# Patient Record
Sex: FEMALE | Race: WHITE
[De-identification: names, ages, dates, MRNs, and addresses within clinical notes are randomized per-mention and may not be internally consistent; named-entity substitution may affect disease eponyms.]

---

## 2018-11-05 NOTE — DIAGNOSTIC IMAGING REPORT
HEPATOBILIARY SCAN



INDICATION: Right upper abdominal pain



Report: Following the administration of 6 mCi of Tc-99m mebrofenin, dynamic

images of the abdomen in the anterior projection were obtained through 60

minutes.  Additional static image was  obtained at 3 hours.



Perfusion of the liver is normal.  Extraction of tracer from the blood pool by

the liver parenchyma is normal.  Tracer appears promptly with in the biliary

tract.  Tracer is seen in the small bowel by 26 minutes post injection of the

radiotracer.  The gallbladder does not fill during the initial 60-minute

dynamic sequence and does not fill by 3 hours.  



Impression:



1.  Absence of filling of the gallbladder through 3 hours post administration

of the radiotracer supports the clinical diagnosis of chronic and/or acute

cholecystitis.  



Signed by: Dr. Megha Mchugh M.D. on 11/5/2018 6:50 PM

## 2019-02-01 NOTE — XMS REPORT
Patient Summary Document

                             Created on: 2019



NORIS GIVENS

External Reference #: [id number]

: 1965

Sex: Female



Demographics







                          Address                   [address] [place] [address]

 

                          Home Phone                [phone]

 

                          Preferred Language        Unknown

 

                          Marital Status            Unknown

 

                          Jehovah's witness Affiliation     Unknown

 

                          Race                      Unknown

 

                                        Additional Race(s)  

 

                          Ethnic Group              Unknown





Author







                          [facility]                    [facility]

 

                          [facility]

 

                          Address                   Unknown

 

                          Phone                     Unavailable







Support







                Name            Relationship    Address         Phone

 

                    HARRY GIVENS        PRS                 [address], [place]  [address]                    [phone]

 

                    HARRY GIVENS        PRS                 [address], [place]  [address]                    [phone]







Care Team Providers







                    Care Team Member Name    Role                Phone

 

                    IVETT MCCURDY     Unavailable         Unavailable







Payers







             Payer Name    Policy Type    Policy Number    Effective Date    Expiration Date







Problems

This patient has no known problems.



Allergies, Adverse Reactions, Alerts







          Allergy Name    Allergy Type    Status    Severity    Reaction(s)    Onset Date    Inactive 

Date                      Treating Clinician        Comments

 

        No Known Allergies    DA      Active    U               2012 00:00:00                     







Medications

This patient has no known medications.



Results







           Test Description    Test Time    Test Comments    Text Results    Atomic Results    Result

 Comments

 

                HEPTOBILIARY    2018 18:46:00                                                              

                                            [place] [address]   
  Patient Name: NORIS GIVENS                                   MR #: 
[id number]                     : 1965                                  
Age/Sex: 53/F  Acct #: [id number]                              Req #: [id number]
[id number]  Adm Physician:                                                      
Ordered by: JESUS MCCURDY MD                            Report #: [id number]   
    Location: [facility]                                      Room/Bed:                 
   
___________________________________________________________________________________________________
   Procedure: [id number] [facility]/HEPTOBILIARY  Exam Date:                             
Exam Time:                                               REPORT STATUS: Signed  
    HEPATOBILIARY SCAN      INDICATION: Right upper abdominal pain      Report: 
Following the administration of 6 mCi of Tc-99m mebrofenin, dynamic   images of 
the abdomen in the anterior projection were obtained through 60   minutes.  
Additional static image was  obtained at 3 hours.      Perfusion of the liver is
normal.  Extraction of tracer from the blood pool by   the liver parenchyma is 
normal.  Tracer appears promptly with in the biliary   tract.  Tracer is seen in
the small bowel by 26 minutes post injection of the   radiotracer.  The 
gallbladder does not fill during the initial 60-minute   dynamic sequence and 
does not fill by 3 hours.        Impression:      1.  Absence of filling of the 
gallbladder through 3 hours post administration   of the radiotracer supports 
the clinical diagnosis of chronic and/or acute   cholecystitis.        Signed 
by: Dr. Beulah Owen M.D. on 2018 6:50 PM        Dictated By: BEULAH OWEN MD  Electronically Signed By: BEULAH OWEN MD on 18  Transcribed By: 
LISSTE on 18       COPY TO:   JESUS MCCURDY MD

## 2019-02-01 NOTE — OPERATIVE REPORT
DATE OF PROCEDURE:  February 01, 2019 



REFERRING PHYSICIAN:  Dr. Jesus Mccurdy 



PROCEDURES PERFORMED

1. Esophagogastroduodenoscopy with biopsies.  

2. Colonoscopy with polypectomy and biopsies.



INDICATION FOR EGD:  Dyspepsia.  



INDICATION FOR COLONOSCOPY:  History of bloody diarrhea.



MEDICATION:  Patient was done under MAC.  Please see anesthesiologist's 

note.



PROCEDURE:  With the patient in the left lateral decubitus position, the 

flexible fiberoptic Olympus gastroscope was introduced into the esophagus 

under direct visualization without any difficulty.  There was some patchy 

erythema noted in the distal esophagus.  A minute tongue of velvety red 

mucosa was noted to extend proximally from the GE junction.  That was 

biopsied to rule out Jones's.  The scope was then advanced with ease into 

the stomach.  The mucosa overlying the antrum and the body revealed some 

patchy erythema and mild to moderate edema, and biopsies were obtained and 

sent to stain for H. pylori.  The pylorus was of normal contour and shape.  

It was intubated with ease, and the scope was advanced all the way to the 

2nd portion of the duodenum.  Biopsies were obtained from the proximal 2nd 

portion and the duodenal bulb to rule out sprue.  The scope was then 

withdrawn back into the stomach and retroflexed.  Mucosa overlying the 

fundus and the cardia appeared to be within normal limits.  The scope was 

then straightened out.  It was subsequently withdrawn.  Patient tolerated 

the procedure well.



IMPRESSION  

1. Mild distal esophagitis.

2. Rule out Jones's esophagus.

3. Gastritis, biopsied.  Biopsies sent to stain for H. pylori.

4. Rule out sprue.



PLAN:  Follow up histology.  Initiate Protonix 40 mg 1 p.o. q.a.m. a.c.



The patient was then turned around.  After adequate lubrication of the anal 

canal, a flexible fiberoptic Olympus colonoscope was inserted into the 

rectum with ease and advanced all the way to the cecum.  The mucosa 

overlying the cecum appeared to be within normal limits.   The ileocecal 

valve was intubated, and the scope was advanced into the terminal ileum.  

Biopsies were obtained from the terminal ileum.  The scope was then 

withdrawn back into the colon.  It was then withdrawn slowly.  Mucosa 

overlying the ascending and transverse appeared to be within normal limits. 

 There were some patchy mild inflammatory changes noted in the left colon 

and rectum, and random biopsies were obtained.  One polyp was snared from 

the sigmoid colon.  The scope was then retroflexed into the distal rectum, 

and small internal hemorrhoids were noted none of which was actively 

bleeding.  The scope was then straightened out.  It was subsequently 

withdrawn after securing an adequate stool specimen that was sent for the 

appropriate stool studies.  Patient tolerated the procedure well.  



IMPRESSION

1. Mild patchy left-sided colitis.

2. Sigmoid colon polyp, snared.  

3. Proctitis, mild.  

4. Internal hemorrhoids, none actively bleeding.  



PLAN:  Follow up histology.  Follow up stool studies.  Initiate Bentyl 20 

mg 1 p.o. t.i.d. and VSL #3 one p.o. daily.  Patient might benefit from a 

followup colonoscopy in 3 to 5 years.  





DD:  02/01/2019 14:16

DT:  02/01/2019 15:42

Job#:  [id number] 

cc:JESUS MCCURDY MD

## 2020-07-27 ENCOUNTER — HOSPITAL ENCOUNTER (OUTPATIENT)
Dept: HOSPITAL 88 - ER | Age: 55
Setting detail: OBSERVATION
LOS: 1 days | Discharge: HOME | End: 2020-07-28
Attending: FAMILY MEDICINE | Admitting: FAMILY MEDICINE
Payer: COMMERCIAL

## 2020-07-27 VITALS — BODY MASS INDEX: 39.65 KG/M2 | WEIGHT: 238 LBS | HEIGHT: 65 IN

## 2020-07-27 VITALS — DIASTOLIC BLOOD PRESSURE: 79 MMHG | SYSTOLIC BLOOD PRESSURE: 159 MMHG

## 2020-07-27 VITALS — SYSTOLIC BLOOD PRESSURE: 143 MMHG | DIASTOLIC BLOOD PRESSURE: 67 MMHG

## 2020-07-27 DIAGNOSIS — E78.5: ICD-10-CM

## 2020-07-27 DIAGNOSIS — F32.9: ICD-10-CM

## 2020-07-27 DIAGNOSIS — Z20.828: ICD-10-CM

## 2020-07-27 DIAGNOSIS — I10: ICD-10-CM

## 2020-07-27 DIAGNOSIS — R07.89: Primary | ICD-10-CM

## 2020-07-27 DIAGNOSIS — R53.1: ICD-10-CM

## 2020-07-27 DIAGNOSIS — Z90.49: ICD-10-CM

## 2020-07-27 DIAGNOSIS — R06.02: ICD-10-CM

## 2020-07-27 LAB
ALBUMIN SERPL-MCNC: 3.6 G/DL (ref 3.5–5)
ALBUMIN/GLOB SERPL: 1.1 {RATIO} (ref 0.8–2)
ALP SERPL-CCNC: 70 IU/L (ref 40–150)
ALT SERPL-CCNC: 40 IU/L (ref 0–55)
ANION GAP SERPL CALC-SCNC: 14.9 MMOL/L (ref 8–16)
BASOPHILS # BLD AUTO: 0.1 10*3/UL (ref 0–0.1)
BASOPHILS NFR BLD AUTO: 0.4 % (ref 0–1)
BUN SERPL-MCNC: 10 MG/DL (ref 7–26)
BUN/CREAT SERPL: 13 (ref 6–25)
CALCIUM SERPL-MCNC: 9.2 MG/DL (ref 8.4–10.2)
CHLORIDE SERPL-SCNC: 103 MMOL/L (ref 98–107)
CK MB SERPL-MCNC: 0.8 NG/ML (ref 0–5)
CK SERPL-CCNC: 31 IU/L (ref 29–168)
CO2 SERPL-SCNC: 25 MMOL/L (ref 22–29)
DEPRECATED NEUTROPHILS # BLD AUTO: 6.8 10*3/UL (ref 2.1–6.9)
EGFRCR SERPLBLD CKD-EPI 2021: > 60 ML/MIN (ref 60–?)
EOSINOPHIL # BLD AUTO: 0.4 10*3/UL (ref 0–0.4)
EOSINOPHIL NFR BLD AUTO: 3.1 % (ref 0–6)
ERYTHROCYTE [DISTWIDTH] IN CORD BLOOD: 12.8 % (ref 11.7–14.4)
GLOBULIN PLAS-MCNC: 3.2 G/DL (ref 2.3–3.5)
GLUCOSE SERPLBLD-MCNC: 92 MG/DL (ref 74–118)
HCT VFR BLD AUTO: 43.6 % (ref 34.2–44.1)
HGB BLD-MCNC: 14.3 G/DL (ref 12–16)
LYMPHOCYTES # BLD: 3.7 10*3/UL (ref 1–3.2)
LYMPHOCYTES NFR BLD AUTO: 30.6 % (ref 18–39.1)
MCH RBC QN AUTO: 29.2 PG (ref 28–32)
MCHC RBC AUTO-ENTMCNC: 32.8 G/DL (ref 31–35)
MCV RBC AUTO: 89.2 FL (ref 81–99)
MONOCYTES # BLD AUTO: 0.7 10*3/UL (ref 0.2–0.8)
MONOCYTES NFR BLD AUTO: 6 % (ref 4.4–11.3)
NEUTS SEG NFR BLD AUTO: 57.1 % (ref 38.7–80)
PLATELET # BLD AUTO: 320 X10E3/UL (ref 140–360)
POTASSIUM SERPL-SCNC: 3.9 MMOL/L (ref 3.5–5.1)
RBC # BLD AUTO: 4.89 X10E6/UL (ref 3.6–5.1)
SODIUM SERPL-SCNC: 139 MMOL/L (ref 136–145)

## 2020-07-27 PROCEDURE — 85025 COMPLETE CBC W/AUTO DIFF WBC: CPT

## 2020-07-27 PROCEDURE — 36415 COLL VENOUS BLD VENIPUNCTURE: CPT

## 2020-07-27 PROCEDURE — 82553 CREATINE MB FRACTION: CPT

## 2020-07-27 PROCEDURE — 84484 ASSAY OF TROPONIN QUANT: CPT

## 2020-07-27 PROCEDURE — 80061 LIPID PANEL: CPT

## 2020-07-27 PROCEDURE — 84443 ASSAY THYROID STIM HORMONE: CPT

## 2020-07-27 PROCEDURE — 93005 ELECTROCARDIOGRAM TRACING: CPT

## 2020-07-27 PROCEDURE — 80053 COMPREHEN METABOLIC PANEL: CPT

## 2020-07-27 PROCEDURE — 80048 BASIC METABOLIC PNL TOTAL CA: CPT

## 2020-07-27 PROCEDURE — 82550 ASSAY OF CK (CPK): CPT

## 2020-07-27 PROCEDURE — 71045 X-RAY EXAM CHEST 1 VIEW: CPT

## 2020-07-27 PROCEDURE — 99284 EMERGENCY DEPT VISIT MOD MDM: CPT

## 2020-07-27 NOTE — DIAGNOSTIC IMAGING REPORT
EXAMINATION:  CHEST SINGLE (PORTABLE)    



INDICATION: Difficulty breathing.



COMPARISON:  None

     

FINDINGS:    



TUBES and LINES:  None.



LUNGS:  Low lung volumes.  There is hazy opacification of the bilateral lung

bases.  



PLEURA:  No pleural effusion or pneumothorax.



HEART AND MEDIASTINUM:  The cardiomediastinal silhouette is unremarkable.    



BONES AND SOFT TISSUES:  No acute osseous lesion.  Soft tissues are

unremarkable.



UPPER ABDOMEN: No free air under the diaphragm.    



IMPRESSION: 



Hazy opacification in bilateral lung bases which may represent atelectasis

and/or pneumonia in the proper clinical setting.





Signed by: Kimberly Vance MD on 7/27/2020 7:54 PM

## 2020-07-27 NOTE — EMERGENCY DEPARTMENT NOTE
History of Present Illnes


History of Present Illness


Chief Complaint:  Chest Pain


History of Present Illness


This is a 55 year old  female  .


Historian:  Patient


Arrival Mode:  Car


Onset (how long ago):  day(s) (1)


Location:  CHEST


Quality:  DULL


Radiation:  Denies non-radiation, Denies back, Denies neck, Denies extremity, 

Denies abdomen, Denies periumbilical, Denies flank, Denies proximal, Denies 

distal, Denies other


Severity:  mild


Onset quality:  gradual


Duration (how long):  day(s) (1)


Timing of current episode:  intermittent


Progression:  waxing and waning


Chronicity:  new


Context:  Denies recent illness, Denies recent surgery, Denies recent 

immobilization, Denies recent travel, Denies trauma/injury, Denies new 

medications, Denies hx of DVT/PE, Denies non-compliance w/ medications, Denies 

other


Relieving factors:  none


Exacerbating factors:  none


Associated symptoms:  Reports chest pain; 


   Denies denies other symptoms, Denies confusion, Denies cough, Denies 

diaphoresis, Denies fever/chills, Denies headaches, Denies loss of appetite, 

Denies malaise, Denies nausea/vomiting, Denies rash, Denies seizure, Denies 

shortness of breath, Denies syncope, Denies weakness, Denies other


Treatments prior to arrival:  none





Past Medical/Family History


Physician Review


I have reviewed the patient's past medical and family history.  Any updates have

been documented here.





Past Medical History


Recent Fever:  No


Clinical Suspicion of Infectio:  No


New/Unexplained Change in Ment:  No


Past Medical History:  Hypertension, Depression


Other Medical History:  


ULCERATIVE COLITIS


Past Surgical History:  Cholecysctectomy, T&A, Tubal Ligation





Social History


Smoking Cessation:  Unknown if ever smoked


Counseling Performed:  No


Alcohol Use:  None


Any Illegal Drug Use:  No


Physically hurt or threatened:  No





Other


Any Pre-Existing Lines (PICC,:  No





Review of Systems


Review of Systems


Constitutional:  Reports no symptoms


Cardiovascular:  Reports as per HPI





Physical Exam


Related Data


Allergies:  


Coded Allergies:  


     No Known Allergies (Unverified , 2/1/19)


Triage Vital Signs





Vital Signs








  Date Time  Temp Pulse Resp B/P (MAP) Pulse Ox O2 Delivery O2 Flow Rate FiO2


 


7/27/20 16:24 99.5 84 18 169/83 98 Room Air  








Vital signs reviewed:  Yes





Physical Exam


CONSTITUTIONAL





Constitutional:  Present well-developed, Present well-nourished


HENT


HENT:  Present normocephalic, Present atraumatic, Present oropharynx 

clear/moist, Present nose normal


HENT L/R:  Present left ext ear normal, Present right ext ear normal


EYES





Eyes:  Reports PERRL, Reports conjunctivae normal


NECK


Neck:  Present ROM normal


PULMONARY


Pulmonary:  Present effort normal, Present breath sounds normal


CARDIOVASCULAR





Cardiovascular:  Present regular rhythm, Present heart sounds normal, Present 

capillary refill normal, Present normal rate


GASTROINTESTINAL





Abdominal:  Present soft, Present nontender, Present bowel sounds normal


GENITOURINARY





Genitourinary:  Present exam deferred


SKIN


Skin:  Present warm, Present dry


MUSCULOSKELETAL





Musculoskeletal:  Present ROM normal


NEUROLOGICAL





Neurological:  Present alert, Present oriented x 3, Present no gross motor or 

sensory deficits


PSYCHOLOGICAL


Psychological:  Present mood/affect normal, Present judgement normal





Results


Laboratory


Result Diagram:  


7/27/20 1648                                                                    

           7/27/20 1648





Laboratory





Laboratory Tests








Test


 7/27/20


16:48


 


White Blood Count


 11.97 x10e3/uL


(4.8-10.8)


 


Red Blood Count


 4.89 x10e6/uL


(3.6-5.1)


 


Hemoglobin


 14.3 g/dL


(12.0-16.0)


 


Hematocrit


 43.6 %


(34.2-44.1)


 


Mean Corpuscular Volume


 89.2 fL


(81-99)


 


Mean Corpuscular Hemoglobin


 29.2 pg


(28-32)


 


Mean Corpuscular Hemoglobin


Concent 32.8 g/dL


(31-35)


 


Red Cell Distribution Width


 12.8 %


(11.7-14.4)


 


Platelet Count


 320 x10e3/uL


(140-360)


 


Neutrophils (%) (Auto)


 57.1 %


(38.7-80.0)


 


Lymphocytes (%) (Auto)


 30.6 %


(18.0-39.1)


 


Monocytes (%) (Auto)


 6.0  %


(4.4-11.3)


 


Eosinophils (%) (Auto)


 3.1 %


(0.0-6.0)


 


Basophils (%) (Auto)


 0.4 %


(0.0-1.0)


 


Neutrophils # (Auto) 6.8 (2.1-6.9) 


 


Lymphocytes # (Auto) 3.7 (1.0-3.2) 


 


Monocytes # (Auto) 0.7 (0.2-0.8) 


 


Eosinophils # (Auto) 0.4 (0.0-0.4) 


 


Basophils # (Auto) 0.1 (0.0-0.1) 


 


Absolute Immature Granulocyte


(auto 0.33 x10e3/uL


(0-0.1)


 


Sodium Level


 139 mmol/L


(136-145)


 


Potassium Level


 3.9 mmol/L


(3.5-5.1)


 


Chloride Level


 103 mmol/L


()


 


Carbon Dioxide Level


 25 mmol/L


(22-29)


 


Anion Gap


 14.9 mmol/L


(8-16)


 


Blood Urea Nitrogen


 10 mg/dL


(7-26)


 


Creatinine


 0.75 mg/dL


(0.57-1.11)


 


Estimat Glomerular Filtration


Rate > 60 ML/MIN


(60-)


 


BUN/Creatinine Ratio 13 (6-25) 


 


Glucose Level


 92 mg/dL


()


 


Calcium Level


 9.2 mg/dL


(8.4-10.2)


 


Total Bilirubin


 0.3 mg/dL


(0.2-1.2)


 


Aspartate Amino Transf


(AST/SGOT) 17 IU/L (5-34) 





 


Alanine Aminotransferase


(ALT/SGPT) 40 IU/L (0-55) 





 


Alkaline Phosphatase


 70 IU/L


()


 


Creatine Kinase


 31 IU/L


()


 


Creatine Kinase MB


 0.80 ng/mL


(0-5.0)


 


Troponin I


 < 0.001 ng/mL


(0-0.300)


 


Total Protein


 6.8 g/dL


(6.5-8.1)


 


Albumin


 3.6 g/dL


(3.5-5.0)


 


Globulin


 3.2 g/dL


(2.3-3.5)


 


Albumin/Globulin Ratio 1.1 (0.8-2.0) 








Lab results reviewed:  Yes





Imaging


Imaging results reviewed:  Yes





Procedures


12 Lead ECG Interpretation


ECG Interpretation :  


   ECG:  ECG 1


   :  Interpreted by ED physician


   Date:  Jul 27, 2020


   Time:  16:26


   Rhythm:  sinus rhythm


   Rate:  normal


   BPM:  90


   QRS axis:  normal


   T waves flattening:  III, aVF


   Clinical Impression:  abnormal ECG





Assessment & Plan


Medical Decision Making


MDM


CAD





Reassessment


Reassessment


BETTER





Assessment & Plan


Final Impression:  


(1) Chest pain


Depart Disposition:  ADMITTED


Last Vital Signs











  Date Time  Temp Pulse Resp B/P (MAP) Pulse Ox O2 Delivery O2 Flow Rate FiO2


 


7/27/20 16:24 99.5 84 18 169/83 98 Room Air  








Home Meds


Reported Medications


Montelukast Sodium (MONTELUKAST SODIUM) 10 Mg Tablet, 10 MG PO DAILY, #30 TAB


   1/25/19


Losartan Potassium (LOSARTAN POTASSIUM) 25 Mg Tablet, 25 MG PO DAILY


   1/25/19


[Diclofenac]   No Conflict Check, 75 MG PO DAILY


   1/25/19


Clonidine Hcl (CLONIDINE HCL) 0.1 Mg Tablet, 1 TAB PO DAILY, #60 TAB


   1/25/19


Medications in the ED





Aspirin 81 mg PRN  ONCE PO ;  Start 7/27/20 at 16:45;  Stop 7/27/20 at 16:52;  

Status DC











ANGELO RIOJAS MD             Jul 27, 2020 17:45

## 2020-07-28 VITALS — DIASTOLIC BLOOD PRESSURE: 71 MMHG | SYSTOLIC BLOOD PRESSURE: 128 MMHG

## 2020-07-28 VITALS — DIASTOLIC BLOOD PRESSURE: 70 MMHG | SYSTOLIC BLOOD PRESSURE: 128 MMHG

## 2020-07-28 VITALS — DIASTOLIC BLOOD PRESSURE: 80 MMHG | SYSTOLIC BLOOD PRESSURE: 138 MMHG

## 2020-07-28 VITALS — SYSTOLIC BLOOD PRESSURE: 138 MMHG | DIASTOLIC BLOOD PRESSURE: 80 MMHG

## 2020-07-28 VITALS — SYSTOLIC BLOOD PRESSURE: 106 MMHG | DIASTOLIC BLOOD PRESSURE: 67 MMHG

## 2020-07-28 LAB
ANION GAP SERPL CALC-SCNC: 15 MMOL/L (ref 8–16)
BASOPHILS # BLD AUTO: 0.1 10*3/UL (ref 0–0.1)
BASOPHILS NFR BLD AUTO: 0.4 % (ref 0–1)
BUN SERPL-MCNC: 10 MG/DL (ref 7–26)
BUN/CREAT SERPL: 14 (ref 6–25)
CALCIUM SERPL-MCNC: 8.9 MG/DL (ref 8.4–10.2)
CHLORIDE SERPL-SCNC: 104 MMOL/L (ref 98–107)
CHOLEST SERPL-MCNC: 248 MD/DL (ref 0–199)
CHOLEST/HDLC SERPL: 5.4 {RATIO} (ref 3–3.6)
CK MB SERPL-MCNC: 0.7 NG/ML (ref 0–5)
CK MB SERPL-MCNC: 0.8 NG/ML (ref 0–5)
CK SERPL-CCNC: 34 IU/L (ref 29–168)
CK SERPL-CCNC: 35 IU/L (ref 29–168)
CO2 SERPL-SCNC: 25 MMOL/L (ref 22–29)
DEPRECATED NEUTROPHILS # BLD AUTO: 8.1 10*3/UL (ref 2.1–6.9)
EGFRCR SERPLBLD CKD-EPI 2021: > 60 ML/MIN (ref 60–?)
EOSINOPHIL # BLD AUTO: 0.3 10*3/UL (ref 0–0.4)
EOSINOPHIL NFR BLD AUTO: 2.7 % (ref 0–6)
ERYTHROCYTE [DISTWIDTH] IN CORD BLOOD: 12.7 % (ref 11.7–14.4)
GLUCOSE SERPLBLD-MCNC: 90 MG/DL (ref 74–118)
HCT VFR BLD AUTO: 43.4 % (ref 34.2–44.1)
HDLC SERPL-MSCNC: 46 MG/DL (ref 40–60)
HGB BLD-MCNC: 14.1 G/DL (ref 12–16)
LDLC SERPL CALC-MCNC: 171 MG/DL (ref 60–130)
LYMPHOCYTES # BLD: 3.2 10*3/UL (ref 1–3.2)
LYMPHOCYTES NFR BLD AUTO: 25.6 % (ref 18–39.1)
MCH RBC QN AUTO: 29.1 PG (ref 28–32)
MCHC RBC AUTO-ENTMCNC: 32.5 G/DL (ref 31–35)
MCV RBC AUTO: 89.7 FL (ref 81–99)
MONOCYTES # BLD AUTO: 0.7 10*3/UL (ref 0.2–0.8)
MONOCYTES NFR BLD AUTO: 5.3 % (ref 4.4–11.3)
NEUTS SEG NFR BLD AUTO: 64.6 % (ref 38.7–80)
PLATELET # BLD AUTO: 315 X10E3/UL (ref 140–360)
POTASSIUM SERPL-SCNC: 4 MMOL/L (ref 3.5–5.1)
RBC # BLD AUTO: 4.84 X10E6/UL (ref 3.6–5.1)
SODIUM SERPL-SCNC: 140 MMOL/L (ref 136–145)
TRIGL SERPL-MCNC: 154 MG/DL (ref 0–149)
TSH SERPL DL<=0.005 MIU/L-ACNC: 1.69 UIU/ML (ref 0.35–4.94)

## 2020-07-28 RX ADMIN — DICYCLOMINE HYDROCHLORIDE SCH MG: 20 TABLET ORAL at 08:49

## 2020-07-28 RX ADMIN — DICYCLOMINE HYDROCHLORIDE SCH MG: 20 TABLET ORAL at 13:00

## 2020-07-28 NOTE — HISTORY AND PHYSICAL
REASON FOR ADMISSION:  This is a 55-year-old lady, who came in with chest pain.

 

HISTORY PRESENT ILLNESS:  A 55-year-old female with history of hypertension, depression,

and allergies.  She was in usual state of health until the patient had symptoms of chest

pain.  The patient's chest pain is described as dull with some shortness of breath and

some ache.  The patient has had exposure to her , who is COVID positive, came in

with symptoms of chest pain, admitted to COVID huitron and also for rule out acute coronary

syndrome. 

 

PAST MEDICAL HISTORY:  History of hypertension, depression, and allergies.  The patient

also has history of colitis. 

 

PAST SURGICAL HISTORY:  History of cholecystectomy, D and C.

 

SOCIAL HISTORY:  No EtOH.  No IV drug abuse.  Nonsmoker.  No alcohol abuse either.

 

FAMILY HISTORY:  Noncontributory.  No history of coronary artery disease.

 

PHYSICAL EXAMINATION:

GENERAL:  The patient is alert and oriented x3. 

VITAL SIGNS:  Temperature afebrile.  The patient's blood pressure is 130/80, heart rate

is 22, respirations of 18. 

LABORATORY VALUES:  White count was 11.96, hemoglobin of 14.3, hematocrit of 43.6.

Chemistries; sodium of 139, potassium 3.9, BUN of 10, creatinine 0.75.  Triglycerides

154, cholesterol of 248, LDL of 171, and TSH is 1.690.  Serology; coronavirus is still

pending, but the patient has symptoms consistent with COVID-19 exposure. 

 

ASSESSMENT:  Ms. Jailene Brewer with:

1. Coronavirus disease exposure and possible coronavirus disease symptoms.

2. Hypertension.

3. Chest pain, noncardiac.

4. Hyperlipidemia.

 

PLAN:  Continue to monitor the patient.  The patient can be discharged from medicine

standpoint of view.  Continue home medications.  Further recommendation as an

outpatient.  We will continue following her up and also we will follow her up in the

clinic in about a week's time, can be discharged today. 

 

 

 

 

______________________________

MD MYLA Lu/MODL

D:  07/28/2020 18:17:33

T:  07/28/2020 23:42:59

Job #:  [id number]

## 2020-07-28 NOTE — NUR
Patient discharged Home, Alert with No distress, IV canula removed with tip intact, no ss of 
infiltration noted,  here to pick her

## 2020-07-28 NOTE — CONSULTATION
DATE OF CONSULTATION:  07/28/2020

 

Cardiac Consultation 

 

REASON FOR CONSULTATION:  Chest pain.

 

HISTORY OF PRESENT ILLNESS:  A delightful 55-year-old lady, who is known to have colitis

and seasonal allergic and mild hypertension.  She is relatively active.  She does have

also some minor depression, she takes medication for that.  She was in her usual status

of health.  Five weeks ago her  had COVID and she probably got it also from him

because she lost test and she had symptoms.  However, despite being all that long she is

still feeling not so well.  Yesterday she had some shortness of breath and dull chest

ache.  She was alarmed.  She was worried maybe she is worsening.  She came to the

emergency room.  Chest x-ray showed haziness in the lung fields.  Her oxygen saturation

was maintained good.  At that time, the patient admitted and cardiac consult obtained.

Serial cardiac enzymes were done and they were negative.  I visited with her today over

the phone and she is doing well.  She denied having any chest pain or any shortness of

breath.  It seems she prefers to go home. 

 

REVIEW OF SYSTEMS:

GENERAL:  No fever, no chills.   

HEENT:  Congestion. 

PULMONARY AND CARDIAC:  As per acute illness mainly dull ache, which is resolved, but

really she is still weak since that time and she is not feeling so good despite all this

days. 

GI:  Poor appetite.  No hematemesis.  No melena. 

:  No hematuria.  No dysuria. 

MUSCULOSKELETAL:  Aches and pain.  She is still weak. 

NEUROLOGIC:  No seizure activity.

 

SOCIAL HISTORY:  She is .  She is nonsmoker and non-alcohol drinker.

 

PAST SURGICAL HISTORY:  

1. Cholecystectomy.

2. D and C and tubal ligation.  

3. Colitis.

4. Hypertension.  

5. Seasonal allergies.  

6. Mild depression.

 

FAMILY HISTORY:  Noncontributory.  There is no premature history of coronary artery

disease although father and mother have CAD at older age. 

 

PHYSICAL EXAMINATION:

VITAL SIGNS:  Height 5 feet 5 inches, weight of 238, temperature of 98.8, blood pressure

138/80, heart rate 20, and respiratory rate of 18. 

LABORATORY DATA:  Sodium of 140, potassium of 4, BUN of 10, creatinine of 0.7, glucose

of 90.  White blood cell count of 12.5, hemoglobin of 14.1, hematocrit 43%, platelet

count of 315,000.  EKG, no acute changes, normal sinus rhythm, inferior Q-wave noted.

Serial cardiac enzymes are normal.  COVID testing is pending.  Electrolytes are normal.

There is no lipid profile, which I will order.  Chest x-ray showing haziness in the lung

fields. 

 

IMPRESSION:  

1. Possible sequela of COVID.  The patient is still weak.

2. Hypertension. 

From a cardiac point of view, we will hold now on any other workup.  We will await

patient for recovery.  She will be visiting with me in my office and take care of her

father and mother, so she knows me and she will follow with me.  In case of any change

in her symptoms, she will promise me to give me a call. 

 

ALLERGIES:  NONE.

 

HOME MEDICATIONS:  Include losartan 25 mg a day, clonidine p.r.n., Paxil 20 mg a day,

dicyclomine 20 mg q.i.d., Protonix 40 mg a day, Singulair 10 mg a day and inhalers

p.r.n. 

 

 

 

 

______________________________

MD TEDDY Forte/MODL

D:  07/28/2020 12:13:39

T:  07/28/2020 15:33:13

Job #:  [id number]

## 2020-07-28 NOTE — NUR
Patient resting up in bed, denies any chest pain or SOB, Dr Willis talked to her thru 
phone, he said patient can go home from his side and f/up in his office for further workup, 
Notified Dr Gross, He said he will come n see the patient today.

## 2020-07-28 NOTE — NUR
REPORT GIVEN TO DAYSHIFT NURSE. ALERT AND ORIENTED. RESTING IN BED. NO SIGNS IV 
INFILTRATION. BED LOCKED AND IN LOW POSITION. CALL LIGHT WITHIN REACH.

## 2024-10-14 NOTE — DIAGNOSTIC IMAGING REPORT
EXAMINATION: CT of the abdomen and pelvis with contrast.



TECHNIQUE: 

Spiral CT images of the abdomen and pelvis were performed from the lung bases

to the lesser trochanters after the intravenous administration of 100 cc of

Isovue-370 and the oral administration of water.  Coronal and sagittal

reformatted images were obtained.



COMPARISON: Nuclear medicine hepatobiliary scan 11/5/2018



CLINICAL HISTORY:Abdominal pain, bloody diarrhea. Patient reports history of

cholecystectomy, tubal ligation, and "uterus ablation".

     

DISCUSSION:



ABDOMEN/PELVIS:



LOWER THORAX:Unremarkable.



HEPATOBILIARY: 1 cm lesion in hepatic segment 4A, average internal attenuation

10 Hounsfield units, compatible with a simple cyst. No additional focal hepatic

lesions of the visualized portions of the liver. The hepatic dome is

incompletely visualized secondary to scan field.  No intra-or extrahepatic

biliary ductal dilation.  The gallbladder has been removed with metallic clips

noted in the gallbladder fossa. 



SPLEEN: No splenomegaly.



PANCREAS: No focal masses or ductal dilatation. 



ADRENALS: No adrenal nodules.



KIDNEYS/URETERS: No hydronephrosis, stones, or solid mass lesions.



PELVIC ORGANS/BLADDER: The urinary bladder is incompletely distended but

otherwise unremarkable. The uterus is anteflexed and appears normal. Bilateral

tubal ligation clips. No adnexal mass.



PERITONEUM/RETROPERITONEUM: No free air or fluid.



LYMPH NODES: No pelvic sidewall, retroperitoneal, or mesenteric

lymphadenopathy.



VESSELS: Abdominal aorta, major branch vessels, and iliac arterial systems are

well-visualized and patent. Minimal calcified atherosclerotic plaque along the

right common iliac artery. Portal vein, splenic vein, and central superior

mesenteric vein are patent.



GI TRACT: There is a short segment focus of wall thickening and vascular

engorgement involving the distal ascending colon and hepatic flexure,

exemplified on series 2 image 29. The remainder of the large bowel is

unremarkable. The appendix is normal. There is no small bowel dilatation to

suggest obstruction.



BONES AND SOFT TISSUE: No focal soft tissue abnormalities. No osseous

destructive lesions. Mild degenerative disc changes and facet arthropathy of

the lower lumbar spine.  No soft tissue abnormalities.  



IMPRESSION: 



Short segment infectious or inflammatory colitis involving the distal ascending

colon and hepatic flexure. No evidence of perforation or drainable fluid

collection.



Signed by: Dr. Delvin Uriarte M.D. on 2/7/2019 9:34 AM
hernia